# Patient Record
Sex: FEMALE | Race: WHITE | NOT HISPANIC OR LATINO | Employment: OTHER | URBAN - METROPOLITAN AREA
[De-identification: names, ages, dates, MRNs, and addresses within clinical notes are randomized per-mention and may not be internally consistent; named-entity substitution may affect disease eponyms.]

---

## 2018-09-05 ENCOUNTER — TRANSCRIBE ORDERS (OUTPATIENT)
Dept: ADMINISTRATIVE | Facility: HOSPITAL | Age: 78
End: 2018-09-05

## 2018-09-05 DIAGNOSIS — R62.50: ICD-10-CM

## 2018-09-05 DIAGNOSIS — Q02: ICD-10-CM

## 2018-09-05 DIAGNOSIS — T14.90XA TRAUMA: Primary | ICD-10-CM

## 2018-09-05 DIAGNOSIS — G20 PARKINSON'S DISEASE (HCC): ICD-10-CM

## 2018-09-05 DIAGNOSIS — R56.9: ICD-10-CM

## 2018-09-24 ENCOUNTER — HOSPITAL ENCOUNTER (OUTPATIENT)
Dept: MRI IMAGING | Facility: HOSPITAL | Age: 78
Discharge: HOME | End: 2018-09-24
Attending: PSYCHIATRY & NEUROLOGY
Payer: MEDICARE

## 2018-09-24 DIAGNOSIS — R56.9: ICD-10-CM

## 2018-09-24 DIAGNOSIS — T14.90XA TRAUMA: ICD-10-CM

## 2018-09-24 DIAGNOSIS — G20 PARKINSON'S DISEASE (HCC): ICD-10-CM

## 2018-09-24 DIAGNOSIS — Q02: ICD-10-CM

## 2018-09-24 DIAGNOSIS — R62.50: ICD-10-CM

## 2018-09-24 PROCEDURE — 70551 MRI BRAIN STEM W/O DYE: CPT

## 2019-03-08 DIAGNOSIS — K21.9 GASTROESOPHAGEAL REFLUX DISEASE WITHOUT ESOPHAGITIS: Primary | ICD-10-CM

## 2019-03-08 RX ORDER — OMEPRAZOLE 20 MG/1
20 CAPSULE, DELAYED RELEASE ORAL DAILY
Qty: 90 CAPSULE | Refills: 3 | Status: SHIPPED | OUTPATIENT
Start: 2019-03-08

## 2019-03-08 NOTE — TELEPHONE ENCOUNTER
PHARMACY CALLED TO REFILL OMEPRAZOLE 20 MG  ITS THE [facility]   [phone]